# Patient Record
Sex: FEMALE
[De-identification: names, ages, dates, MRNs, and addresses within clinical notes are randomized per-mention and may not be internally consistent; named-entity substitution may affect disease eponyms.]

---

## 2019-02-06 ENCOUNTER — HOSPITAL ENCOUNTER (INPATIENT)
Dept: HOSPITAL 31 - C.ER | Age: 39
LOS: 5 days | Discharge: HOME | DRG: 430 | End: 2019-02-11
Attending: PSYCHIATRY & NEUROLOGY | Admitting: PSYCHIATRY & NEUROLOGY
Payer: MEDICAID

## 2019-02-06 VITALS — OXYGEN SATURATION: 97 % | RESPIRATION RATE: 20 BRPM

## 2019-02-06 DIAGNOSIS — G40.802: ICD-10-CM

## 2019-02-06 DIAGNOSIS — F31.64: Primary | ICD-10-CM

## 2019-02-06 DIAGNOSIS — F60.3: ICD-10-CM

## 2019-02-06 DIAGNOSIS — J45.909: ICD-10-CM

## 2019-02-06 DIAGNOSIS — Z91.5: ICD-10-CM

## 2019-02-06 DIAGNOSIS — F41.9: ICD-10-CM

## 2019-02-06 PROCEDURE — GZ56ZZZ INDIVIDUAL PSYCHOTHERAPY, SUPPORTIVE: ICD-10-PCS | Performed by: PSYCHIATRY & NEUROLOGY

## 2019-02-06 PROCEDURE — GZHZZZZ GROUP PSYCHOTHERAPY: ICD-10-PCS | Performed by: PSYCHIATRY & NEUROLOGY

## 2019-02-06 NOTE — PCM.PSYCH
Initial Psychiatric Evaluation





- Initial Psychiatric Evaluation


Type of Admission: Voluntary


Legal Status: Capacity


Chief Complaint (in patient's own words): 





I was feeling down and depressed.'


History of Present Illness and Precipitating Events: 





Pt is a 38 y.o.  female, who was transferred from [facility], because of 

depressed mood and suicidal ideation. 





Pt. reports psychiatric history of bipolar disorder, borderline personality dis

order, and anxiety. Pt has history of multiple inpatient psychiatric 

hospitalizations and she reports history of follow up with a psychiatrist in the

past. Pt reports that she ran out of her medications including suboxone. 

Yesterday she  overdosed on 8 neurontin tablets to kill herself.  





She reports depressed mood and feelings of hopelessness and helplessness. She 

reports irritability and agitation and at times racing thoughts. She reports 

history of suicidal ideation and one attempt by cutting wrist, in the past. She 

reports of taking klonopins for anxiety and taking percocets for pain. She 

reports of auditory hallucinations but denies any other psychotic symptoms. 





PMH: 


Asthma, Seizures





Current Medications: 





Active Medications











Generic Name Dose Route Start Last Admin





  Trade Name Freq  PRN Reason Stop Dose Admin


 


Influenza Virus Vaccine  60 mcg  02/09/19 10:00  





  Flucelvax Quad 9086-0154 Syr  IM  02/09/19 10:01  





  .ONCE ONE   





     





     





     





     


 


Pneumococcal Polyvalent Vaccine  0.5 ml  02/08/19 10:00  





  Pneumovax 23 Vaccine  IM  02/08/19 10:01  





  .ONCE ONE   





     





     





     





     














Past Psychiatric History





- Past Psychiatric History


Previous Treatment History: Inpatient


Pertinent Medical Hx (Current Medical&Sleep Prob, Allergies): 





                                    Allergies











Allergy/AdvReac Type Severity Reaction Status Date / Time


 


No Known Allergies Allergy   Verified 02/06/19 00:17








                                        





Buprenorphine HCl/Naloxone HCl [Suboxone 8 mg-2 mg Sl Film] 1 each SL DAILY 

02/06/19 


Clonazepam [Klonopin] 2 mg PO DAILY PRN 02/06/19 


Fluoxetine HCl [Prozac] 80 mg PO DAILY 02/06/19 


Gabapentin 600 mg PO QPM 02/06/19 


Gabapentin [Neurontin] 800 mg PO Q12 02/06/19 


Levetiracetam [Keppra] 500 mg PO BID 02/06/19 


Ziprasidone [Geodon] 60 mg PO DAILY 02/06/19 


busPIRone [Buspar] 10 mg PO TID 02/06/19 











Review of Systems





- Review of Systems


All systems: reviewed and no additional remarkable complaints except





- Psychiatric


Psychiatric: Anxiety, Auditory Hallucinations, Irritability, Mood Swings, 

Suicidal Ideation





Mental Status Examination





- Personal Presentation


Personal Presentation: Looks stated age





- Affect


Affect: Broad





- Motor Activity


Motor Activity: Psychomotor Agitation





- Reliability in Providing Information


Reliability in Providing Information: Poor, due to altered mood





- Speech


Speech: Organized





- Mood


Mood: Depressed, Anxious





- Formal Thought Process


Formal Thought Process: Hallucinations, Paranoia





- Hallucinations/Delusions


Hallucinations: Auditory


Delusions: Persecution





- Obsessions/Compulsions


Obsessions: No


Compulsions: No





- Cognitive Functions


Orientation: Person, Place, Situation, Time


Sensorium: Alert


Attention/Concentration: Attentive


Abstract Thinking: [place]


Estimate of Intelligence: Below average


Judgement: Imparied, as evidence by: Poor judgement, Imparied, as evidence by: 

Lack of insight into illness





- Risk


Risk: Suicidal, Diminished functioning





- Limitations


Limitations: Living alone





DSM 5 DX





- DSM 5


DSM 5 Diagnosis: 


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 


Sedative hypnotics use disorder severe 


Sedative hypnotics withdrawal


Opioid use disorder severe


Opioid withdrawal








- Recommended/Plan of Treatment


Treatment Recommendations and Plan of Treatment: 








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 


Sedative hypnotics use disorder severe 


Sedative hypnotics withdrawal


Opioid use disorder severe


Opioid withdrawal














-CBT 


-Psychoeducation 


-Supportive therapy and group therapy


-Ativan taper


-Methadone taper


-Gabapentin for augmentation


-Geodon for for Psychosis 


-Neurontin for augmentation


-Hydroxyzine for anxiety


-Keppra for seizure








- Smoking Cessation


Smoking Cessation Initiated: No

## 2019-02-06 NOTE — C.PDOC
History Of Present Illness


The patient presents to the ED as a psychiatric transfer. Patient was evaluated 

and medically cleared at [facility] and accepted by Dr. Newsome as a 

psychiatric transfer for depression. Patient offers no additional complaints at 

this time. 


Time Seen by Provider: 02/06/19 00:13


History Per: Patient


History/Exam Limitations: no limitations


Onset/Duration Of Symptoms: Hrs


Current Symptoms Are (Timing): Still Present


Associated Symptoms: Depression


Additional History Per: Patient





Past Medical History


Reviewed: Historical Data, Nursing Documentation, Vital Signs





- Medical History


PMH: No Chronic Diseases


Surgical History: No Surg Hx


Family History: States: Unknown Family Hx





Review Of Systems


Cardiovascular: Negative for: Chest Pain, Palpitations


Respiratory: Negative for: Cough, Shortness of Breath


Skin: Negative for: Rash, Lesions, Jaundice, Bruising


Psych: Positive for: Depression, Other (psychiatric transfer ).  Negative for: 

Suicidal ideation





Physical Exam





- Physical Exam


Appears: Non-toxic, No Acute Distress


Skin: Warm, Dry


Oral Mucosa: Moist


Chest: Symmetrical, No Deformity


Respiratory: No Accessory Muscle Use


Extremity: Normal ROM


Neurological/Psych: Oriented x3





ED Course And Treatment


O2 Sat by Pulse Oximetry: 99 (on RA )


Pulse Ox Interpretation: Normal





Disposition


Discussed With Dr.: Tricia Newsome


Comment: accepted the pt on his service and took over the care at 12:42 AM


Doctor Will See Patient In The: Hospital


Counseled Patient/Family Regarding: Studies Performed, Diagnosis





- Disposition


Disposition: HOSPITALIZED


Disposition Time: 00:13


Condition: FAIR





- POA


Present On Arrival: None





- Clinical Impression


Clinical Impression: 


 Major depression, Anxiety








- Scribe Statement


The provider has reviewed the documentation as recorded by the Scribe (Beckie Stauffer)


Provider Attestation: 








All medical record entries made by the Scribe were at my direction and 

personally dictated by me. I have reviewed the chart and agree that the record 

accurately reflects my personal performance of the history, physical exam, 

medical decision making, and the department course for this patient. I have also

personally directed, reviewed, and agree with the discharge instructions and 

disposition.





Decision To Admit





- Pt Status Changed To:


Hospital Disposition Of: Inpatient





- Admit Certification


Admit to Inpatient:: After my assessment, the patient will require 

hospitalization for at least two midnights.  This is because of the severity of 

symptoms shown, intensity of services needed, and/or the medical risk in this 

patient being treated as an outpatient.





- InPatient:


Physician Admission Certification: I certify that this patient requires 2 or 

more midnights of care for the following reason:: After my assessment, the 

patient will require hospitalization for at least two midnights.  This is 

because of the severity of symptoms shown, intensity of services needed, and/or 

the medical risk in this patient being treated as an outpatient.





- .


Bed Request Type: Psychiatry


Admitting Physician: Tricia Newsome


Patient Diagnosis: 


 Major depression, Anxiety

## 2019-02-06 NOTE — PCM.BM
<Gabriel Carrillo - Last Filed: 02/06/19 04:41>





Treatment Plan Problems





- Problems identified on initial assessmt


  ** Medication Nonadherence


Date Initiated: 02/06/19


Time Initiated: 01:35


Assessment reference: NA


Status: Active





  ** Anxiety


Date Initiated: 02/06/19


Time Initiated: 01:35


Assessment reference: NA


Status: Active





Treatment assets and liabiliti


Patient Assests: adapts well, cooperative, ADL independent, good support system,

negotiates basic needs, cognitively intact


Patient Liabilities: financial problems





- Milieu Protocol


Maintain good personal hygiene: daily Encourage regular showers, daily Remind 

patient to perform daily oral care, daily Assist patient to perform ADL's


Conduct patient checks and document Observation sheet: Q15 minutes


Maintain personal safety: every shift Educate patient to report safety concerns 

to staff, every shift Monitor environment for contraband/sharps


Medication safety: Monitor for expected outcome, potential side effects: every 

shift, Assess barriers to learning: every shift, Assess readiness for medication

education: every shift





<Tricia Newsome - Last Filed: 02/06/19 10:27>





- Diagnosis


(1) Bipolar disorder with psychotic features


Status: Acute   


Interventions: 





02/06/19 10:27


* Assess/adjust medications daily and /or as needed


* See patient on an individual basis 7x/week to assess level of manic behaviors 

  and stability


* Discuss risks, benefits, side effects and alternatives of medications


* 








(2) Sedative, hypnotic or anxiolytic use disorder, severe, dependence


Status: Acute   


Interventions: 





* Assess 7x/week regarding severity of withdrawal


* Educate regarding risks, benefits, side effects and alternatives of 

  medications


* Use Motivational Interviewing for abstinence


* Use CBT for relapse prevention


* Medication management for withdrawal symptoms


* Encourage medication assisted treatment


* 








(3) Opioid use disorder, moderate, dependence


Status: Acute   


Interventions: 





02/06/19 10:28


* Assess 7x/week regarding severity of withdrawal


* Educate regarding risks, benefits, side effects and alternatives of 

  medications


* Use Motivational Interviewing for abstinence


* Use CBT for relapse prevention


* Medication management for withdrawal symptoms


* Encourage medication assisted treatment


* 








<Martha Sheth - Last Filed: 02/06/19 11:51>





Family Contact


Family involvement: Famliy/SO not involved





- Goals for Treatment


Patient goals for treatment: "I need an outpatient program close to home."





Discharge/Continuing Care





- Education Needs


Education Needs: Patient Medication, Patient Coping Skills





- Discharge


Discharge Criteria: Tolerates medication w/o severe side effects, Reduction of 

target symptoms


Discharge to:: Home





- Treatment Team Participation


Discussed with Family/SO: No


Was Patient/Family/SO present at Treatment Team Meeting: Yes

## 2019-02-07 NOTE — PCM.PYCHPN
Psychiatric Progress Note





- Psychiatric Progress Note


Patient seen today, length of contact: 15 min


Patient Chief Complaint: 





I m feeling depressed.'


Problems Identified/Issues Discussed: 





Patient seen and evaluated, chart reviewed and discussed with the nurse. 


Today patient reports some improvement in her irritability, anxiety and 

agitation. She still reports depressed mood and feelings of hopelessness. 


She still reports withdrawal symptoms including shakes, anxiety, headaches and 

sweating.


She is taking medications and denies any side effects


Symptoms are improving but she needs more time for stabilization. 


Supportive therapy and psychoeducation were given.


Medication Change: Yes


Medical Record Reviewed: Yes





Mental Status Examination





- Cognitive Function


Orientation: Person, Place, Situation, Time


Memory: Intact


Attention: WNL


Concentration: Poor


Association: WNL


Fund of Knowledge: Poor





- Mood


Mood: Depressed, Anxious





- Affect


Affect: Constricted





- Speech


Speech: Soft





- Formal Thought Process


Formal Thought Process: Paranoia





- Suicidal Ideation


Suicidal Ideation: No





- Homicidal Ideation


Homicidal Ideation: No





Goal/Treatment Plan





- Goal/Treatment Plan


Need for Continued Stay: Remain at risks for inpatient hospitalization, Severe 

depression anxiety


Progress Toward Problem(s) and Goals/Treatment Plan: 








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 


Sedative hypnotics use disorder severe 


Sedative hypnotics withdrawal


Opioid use disorder severe


Opioid withdrawal














-CBT 


-Psychoeducation 


-Supportive therapy and group therapy


-Ativan taper


-Methadone taper


-Gabapentin for augmentation


-Geodon for for Psychosis 


-Neurontin for augmentation


-Hydroxyzine for anxiety


-Keppra for seizure

## 2019-02-08 NOTE — PCM.PYCHPN
Psychiatric Progress Note





- Psychiatric Progress Note


Patient seen today, length of contact: 15 min


Patient Chief Complaint: 





I m feeling depressed.'


Problems Identified/Issues Discussed: 





Patient seen and evaluated, chart reviewed and discussed with the nurse. 


As per the staff, the withdrawal symptoms are a little better than yesterday.


Today patient reports some improvement in her irritability, anxiety and 

agitation. She still reports depressed mood and feelings of hopelessness. 


She still reports withdrawal symptoms including shakes, anxiety, headaches and 

sweating.


She is taking medications and denies any side effects


Symptoms are improving but she needs more time for stabilization. 


Supportive therapy and psychoeducation were given.


Medication Change: Yes


Medical Record Reviewed: Yes





Mental Status Examination





- Cognitive Function


Orientation: Person, Place, Situation, Time


Memory: Intact


Attention: WNL


Concentration: Poor


Association: WNL


Fund of Knowledge: Poor





- Mood


Mood: Depressed, Anxious





- Affect


Affect: Constricted





- Speech


Speech: Soft





- Formal Thought Process


Formal Thought Process: Paranoia





- Suicidal Ideation


Suicidal Ideation: No





- Homicidal Ideation


Homicidal Ideation: No





Goal/Treatment Plan





- Goal/Treatment Plan


Need for Continued Stay: Remain at risks for inpatient hospitalization, Severe 

depression anxiety


Progress Toward Problem(s) and Goals/Treatment Plan: 








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 


Sedative hypnotics use disorder severe 


Sedative hypnotics withdrawal


Opioid use disorder severe


Opioid withdrawal














-CBT 


-Psychoeducation 


-Supportive therapy and group therapy


-Ativan taper


-Methadone taper


-Gabapentin for augmentation


-Geodon for for Psychosis 


-Neurontin for augmentation


-Hydroxyzine for anxiety


-Keppra for seizure

## 2019-02-09 NOTE — PCM.PYCHPN
Psychiatric Progress Note





- Psychiatric Progress Note


Patient seen today, length of contact: 15 min


Medication Change: No


Medical Record Reviewed: Yes





Mental Status Examination





- Cognitive Function


Orientation: Person, Place, Situation, Time


Memory: Intact


Attention: WNL


Concentration: Poor


Association: WNL


Fund of Knowledge: Poor





- Mood


Mood: Depressed, Anxious





- Affect


Affect: Constricted





- Speech


Speech: Soft





- Formal Thought Process


Formal Thought Process: Paranoia





- Suicidal Ideation


Suicidal Ideation: No





- Homicidal Ideation


Homicidal Ideation: No





Goal/Treatment Plan





- Goal/Treatment Plan


Need for Continued Stay: Remain at risks for inpatient hospitalization, Severe 

depression anxiety

## 2019-02-11 VITALS — HEART RATE: 84 BPM | TEMPERATURE: 97.6 F | SYSTOLIC BLOOD PRESSURE: 121 MMHG | DIASTOLIC BLOOD PRESSURE: 78 MMHG

## 2019-02-11 NOTE — PCM.PYCHDC
Mental Status Examination





- Mental Status Examination


Orientation: Person, Place, Situation, Time


Memory: Intact


Mood: Neutral


Affect: Constricted


Speech: Soft


Attention: WNL


Concentration: WNL


Association: WNL


Fund of Knowledge: WNL


Formal Thought Process: No Impairment


Description of patient's judgement and insight: 





good, fair


Psychotic Thoughts and Behaviors: 





denies any AVH


Suicidal Ideation: No


Current Homicidal Ideation?: No





Discharge Summary





- Discharge Note


Reason for Hospitalization: 





Pt is a 38 y.o.  female, who was transferred from [facility], because of 

depressed mood and suicidal ideation. 





Pt. reports psychiatric history of bipolar disorder, borderline personality 

disorder, and anxiety. Pt has history of multiple inpatient psychiatric 

hospitalizations and she reports history of follow up with a psychiatrist in the

past. Pt reports that she ran out of her medications including suboxone. 

Yesterday she  overdosed on 8 neurontin tablets to kill herself.  





She reports depressed mood and feelings of hopelessness and helplessness. She 

reports irritability and agitation and at times racing thoughts. She reports 

history of suicidal ideation and one attempt by cutting wrist, in the past. She 

reports of taking klonopins for anxiety and taking percocets for pain. She 

reports of auditory hallucinations but denies any other psychotic symptoms. 








Consultations:: List each consultation separately and include:  1. Reason for 

request.  2. Findings.  3. Follow-up


Summary of Hospital Course include:: 1. Description of specific treatment plan 

utilized for patients during their course of treatmen.  2. Summarize the time-

course for resolution of acute symptoms and/or regressed behaviors.  3. Describe

issues identified and worked on during hospitalization.  4. Describe medication 

utilized.  5. Describe medical problems identified and treated.  6. Reassessment

of suicide risk


Summary of Hospital Course: 


During the course of her stay, patient (pt) started progressively improving and 

no longer remained irritable, depressed, and suicidal. Her mood and anxiety were

improved and she started attending groups and meetings and started socializing. 

Patient denied any feelings of hopelessness, helplessness, and worthlessness, 

denied any problem with the sleep or appetite, denied suicidal ideation or 

homicidal ideation. Pt denied any auditory or visual hallucinations.  She denied

any withdrawal symptoms. Pt was treated with medications along with supportive 

therapy, milieu therapy and group therapy. Some changes were made in her current

medications and patient was discharged on following medications. She tolerated 

these medications very well and denied any side effects.  





- Diagnosis


(1) Bipolar disorder with psychotic features


Status: Acute   





(2) Sedative, hypnotic or anxiolytic use disorder, severe, dependence


Status: Acute   





(3) Opioid use disorder, moderate, dependence


Status: Acute   





- Final Diagnosis (DSM 5)


Condition upon Discharge: FAIR


DSM 5: 





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 


Sedative hypnotics use disorder severe 


Sedative hypnotics withdrawal


Opioid use disorder severe


Opioid withdrawal





Disposition: HOME/ ROUTINE


Follow-up Treatment Plan: 





Followup: 





[facility] [place]








Education:





Pt was educated and counseled about the risks and benefits of taking and not 

taking medications.  





Pt was educated and counseled about the risks of drinking and abusing drugs.  

   





Pt was educated and counseled to go to the ER or call 911 if pt develop suicidal

ideation or homicidal ideation, worsening of symptoms or severe side effects of 

the meds.





Prescriptions/Medication Reconciliation: 


Gabapentin [Neurontin] 300 mg PO TID #90 cap


Levetiracetam [Keppra] 500 mg PO BID #60 tablet


traZODone [Desyrel] 50 mg PO HS PRN #30 tab


 PRN Reason: Insomnia


Ziprasidone [Geodon Cap] 60 mg PO BID #60 cap





- Smoking Cessation


Smoking Cessation Medication prescribed: No





- Antipsychotic Medications


Pt discharged on 2 or more routine antipsychotic medications: No